# Patient Record
(demographics unavailable — no encounter records)

---

## 2024-12-13 NOTE — HISTORY OF PRESENT ILLNESS
[FreeTextEntry1] : MARIA EUGENIA is a 8 year old F who presents to clinic today for initial evaluation of cough.  API: Allergies +dogs, cats. Food allergies +peanuts   INITIAL VISIT: Visit Date: 12/11/2024   Age of Onset of Symptoms: PMH: PSH: Appendectomy Feb 2023 Meds: Birth Hx: PCP/Specialists:   Cough Hx: Triggers: Allergies: cats, dogs, peanuts Hx of wheezing: JESUS Use: Use of oral steroids: ED/Hospitalizations: Daytime cough: Nighttime cough: Respiratory symptoms with exercise: Chest x-ray:   Family hx: Mom- Dad-  Family hx of asthma: Family hx of cystic fibrosis, autoimmune disease, recurrent respiratory infections: Feeding issues, IMLI: CAPRI Sx, Snoring/Gasping/Pauses: Hx of Eczema: Hx of rhinitis, post nasal drip: Hx of recurrent infections (ie: pneumonia, AOM, sinusitis): Seen by pulmonologist before:   Vaccines UTD: Influenza Vaccine: COVID-19 Vaccine: H/o COVID19/RSV infection:   Modified Asthma Predictive Index (mAPI): 4 wheezing illnesses AND 1 major criteria Parental asthma: NO atopic dermatitis: NO Aeroallergen sensitization suspected: NO   OR   2 minor criteria Food sensitization: NO Peripheral blood eosinophilia = % Wheezing apart from colds: NO

## 2025-01-28 NOTE — REASON FOR VISIT
[Initial Evaluation] : an initial evaluation of [Cough] : cough [Mother] : mother [Medical Records] : medical records [Asthma/RAD] : asthma/RAD

## 2025-01-28 NOTE — SOCIAL HISTORY
[Mother] : mother [Father] : father [Grade:  _____] : Grade: [unfilled] [Dog] : dog [Cat] : cat [Bedroom] : not in the bedroom [Basement] : not in the basement [Living Area] : not in the living area [Smokers in Household] : there are no smokers in the home

## 2025-01-28 NOTE — HISTORY OF PRESENT ILLNESS
[FreeTextEntry1] :  MARIA EUGENIA is a 8 year old F who presents to clinic today for initial evaluation of asthma. API: Allergies +dogs, cats. Food allergies +peanuts. Eczema. Maternal history of asthma.  - Has dogs and cats at home, have been around her entire life. Cat lives downstairs.    INITIAL VISIT: Visit Date: 01/28/2025 - History of asthma, diagnosed around 2 years. Was hospitalized at the time in [place], required nasal cannula - Triggers include viral illnesses and cold weather  - Uses QVar 80mcg 1 puff BID PRN for illness, have not given often over the last year. Trying to limit the amount of medication she is receiving - Mom is concerned that Albuterol makes her hungry and increased HR - Cough has been more recurrent lately during the day and night, occurs most days - PCP gave Symbicort 80mcg 2 puffs BID, have not started yet  - Would like to follow up with Allergy- allergic shiners present  - Does endorse snoring, sleeps with mouth open when this happens. No witnessed apneas. Ongoing congestion this Winter   Age of Onset of Symptoms: 2 years PMH: asthma, environmental and food allergies  PSH: appendectomy Meds: Claritin at night  Birth Hx: FT, no complications  PCP/Specialists: Dr. Segovia   Cough Hx: Triggers: viral illnesses, cold weather Allergies: dogs, cats, peanuts  Hx of wheezing: yes  JESUS Use: albuterol PRN Use of oral steroids: OCS 1 year ED/Hospitalizations: Admission at 2 years, no recent ED visits Daytime cough: yes  Nighttime cough: yes Respiratory symptoms with exercise: denies  Chest x-ray: possible when younger    Family hx:  Mom- asthma  Dad- healthy  Brother, 18 years- healthy  Sister, 20 years- healthy   Family hx of asthma: asthma  Family hx of cystic fibrosis, autoimmune disease, recurrent respiratory infections: Feeding issues, MILI: denies  CAPRI Sx, Snoring/Gasping/Pauses: yes, loud snoring most nights. Has been more tired lately in school. Was snoring over the Summer as well, had good energy throughout the day at that time  Hx of Eczema: yes  Hx of rhinitis, post nasal drip: yes  Hx of recurrent infections (ie: pneumonia, AOM, sinusitis): pneumonia last Fall, at 4 years. No recurrent AOM or sinusitis  Seen by pulmonologist before: denies    Vaccines UTD: yes  Influenza Vaccine: yes  COVID-19 Vaccine: denies  H/o COVID19/RSV infection: denies    Modified Asthma Predictive Index (mAPI): 4 wheezing illnesses AND 1 major criteria Parental asthma: NO atopic dermatitis: NO Aeroallergen sensitization suspected: NO   OR   2 minor criteria Food sensitization: NO Peripheral blood eosinophilia = % Wheezing apart from colds: NO

## 2025-01-28 NOTE — CONSULT LETTER
[Dear  ___] : Dear  [unfilled], [Courtesy Letter:] : I had the pleasure of seeing your patient, [unfilled], in my office today. [Please see my note below.] : Please see my note below. [Consult Closing:] : Thank you very much for allowing me to participate in the care of this patient.  If you have any questions, please do not hesitate to contact me. [Sincerely,] : Sincerely, [FreeTextEntry3] : Huong Ballesteros NP

## 2025-01-28 NOTE — PHYSICAL EXAM
[Well Nourished] : well nourished [Well Developed] : well developed [Alert] : ~L alert [Active] : active [Normal Breathing Pattern] : normal breathing pattern [No Respiratory Distress] : no respiratory distress [No Drainage] : no drainage [No Conjunctivitis] : no conjunctivitis [Tympanic Membranes Clear] : tympanic membranes were clear [No Sinus Tenderness] : no sinus tenderness [No Oral Pallor] : no oral pallor [No Oral Cyanosis] : no oral cyanosis [Non-Erythematous] : non-erythematous [No Exudates] : no exudates [Absence Of Retractions] : absence of retractions [Symmetric] : symmetric [Good Expansion] : good expansion [No Acc Muscle Use] : no accessory muscle use [Good aeration to bases] : good aeration to bases [Equal Breath Sounds] : equal breath sounds bilaterally [No Crackles] : no crackles [No Rhonchi] : no rhonchi [No Wheezing] : no wheezing [Normal Sinus Rhythm] : normal sinus rhythm [No Heart Murmur] : no heart murmur [Soft, Non-Tender] : soft, non-tender [No Hepatosplenomegaly] : no hepatosplenomegaly [Non Distended] : was not ~L distended [Abdomen Mass (___ Cm)] : no abdominal mass palpated [Full ROM] : full range of motion [No Clubbing] : no clubbing [Capillary Refill < 2 secs] : capillary refill less than two seconds [No Cyanosis] : no cyanosis [No Contractures] : no contractures [Abnormal Walk] : normal gait [Alert and  Oriented] : alert and oriented [No Abnormal Focal Findings] : no abnormal focal findings [No Birth Marks] : no birth marks [No Rashes] : no rashes [No Skin Lesions] : no skin lesions [FreeTextEntry2] : +allergic shiners [FreeTextEntry4] : +congested nasal mucosa

## 2025-01-28 NOTE — REVIEW OF SYSTEMS
[Snoring] : snoring [Rhinorrhea] : rhinorrhea [Nasal Congestion] : nasal congestion [Postnasl Drip] : postnasal drip [Wheezing] : wheezing [Cough] : cough [Pneumonia] : pneumonia [Eczema] : eczema [Immunizations are up to date] : Immunizations are up to date [Influenza Vaccine this Past Year] : influenza vaccine this past year [Poor Appetite] : no poor appetite [Frequent URIs] : no frequent upper respiratory infections [Apnea] : no apnea [Sinus Problems] : no sinus problems [Recurrent Ear Infections] : no recurrent ear infections [Heart Disease] : no heart disease [Problems Swallowing] : no problems swallowing [Reflux] : no reflux [Failure To Thrive] : no failure to thrive [FreeTextEntry6] : Pneumonia x1 at 4 years, x1 last fall  [COVID-19 Immunization] : no COVID-19 immunization

## 2025-01-28 NOTE — ASSESSMENT
[FreeTextEntry1] : MARIA EUGENIA MONTES DE OCA is a 8 year girl with history of asthma, food and environmental allergies and eczema. Asthma was diagnosed arounds 2 years. Admitted for exacerbation around this time, required NC. No hospitalizations or ED visits since. Asthma predictive index: mother with history of asthma, personal history of atopic dermatitis, food allergies and aeroallergen sensitization increase Her risk for asthma.   Discussed with parents that asthma is a clinical diagnosis based on risk factors, symptoms and response to medications. Symptoms of chronic cough and recurrent wheeze with a response to bronchodilators are consistent with mild persistent asthma. Most of the child's exacerbations are probably triggered by viral illnesses and cold weather. Her asthma symptoms have been intermittent until this Fall and Winter when she developed daily dry cough. Mother is hesitant to give daily ICS and multiple medications. Albuterol has caused increased hunger and tachycardia so she avoids giving this as well. Spirometry notable for mild obstruction. Lungs clear on exam. I would recommend maintenance anti-inflammatory therapy with Fluticasone Propionate 44mcg 2 puffs BID with spacer for several months to decrease airway inflammation, airway reactivity and achieve optimal control of Her asthma symptoms. Use bronchodilators as needed for exacerbations. Albuterol has caused tachycardia in the past, recommend levalbuterol as needed for rescue. Safety and efficacy of anti-inflammatory medications and bronchodilators were reviewed with the parents.   Discussed asthma, seasonality, and exacerbation with specific triggers including cold weather, allergens, exercise, viral illnesses. Educated on proper MDI/spacer technique and importance of medication compliance. Encouraged avoidance of tobacco smoke exposure and educated on its harmful effects in children with asthma. Discussed signs of respiratory distress and when to seek medical attention.   The patient has clinical findings consistent with rhinitis and an intranasal steroid and/or an anti-histamine with Zyrtec/Claritin and Flonase may be helpful in controlling symptoms associated with a post-nasal drip and upper airway cough syndrome. Environmental allergies +dogs, cats. Has pet cat at home that mainly resides downstairs. Has pet dog at home that does not enter her room often. Food allergies +peanuts. Recommend Allergy visit to help address allergic triggers and determine eligibility for IT or biologics. Aggressive control of airway inflammation secondary to allergies would help achieve optimal asthma control.  Intermittent snoring, sleeps with mouth open most nights. No significant loud snoring, no witnessed apneas. Has been struggling with ongoing nasal congestion this Winter despite Flonase. Recommend ENT evaluation to evaluate for ATH. Instructed mom to record episodes of snoring and monitor for apneas. Consider PSG in the future if symptoms persist.    No confounding issues such as MILI at this time.   No history of recurrent respiratory infections making immune deficiency, cystic fibrosis and primary ciliary dyskinesia less likely at this time.   Discussed recommendations for flu and COVID 19 vaccines. Safety, side effects and efficacy reviewed.   Parents received plans well.   Follow up in 2 months.  Plan: - Flovent 110mcg 2 puffs BID -Claritin and Flonase daily - Albuterol PRN - ENT and Allergy referral - Follow up 2 months

## 2025-03-25 NOTE — HISTORY OF PRESENT ILLNESS
[FreeTextEntry1] : MARIA EUGENIA is a 8 year old F with history of mild persistent asthma, allergic rhinitis, eczema.  API: Allergies +dogs, cats. Food allergies +peanuts. Eczema. Maternal history of asthma.  - Has dogs and cats at home, have been around her entire life. Cat lives downstairs.    March 24th, 2025 Follow up visit: Interval Hx: -Last seen Jan 2025; recs: Flovent 110mcg 2 puffs BID, Claritin, Flonase. ENT and Allergy referral. - Doing well  - Daily meds: Rescue meds: Albuterol PRN Recent ER visits/hospitalizations: Last oral steroid course: Baseline daytime cough, SOB or wheeze: Baseline nocturnal cough, SOB or wheeze: Exertional cough, SOB or wheeze: Allergic rhinitis symptoms: Snoring:   Flu vaccine [id number]: Misc notes:  ==  INITIAL VISIT: Visit Date: 01/28/2025 - History of asthma, diagnosed around 2 years. Was hospitalized at the time in [place], required nasal cannula - Triggers include viral illnesses and cold weather  - Uses QVar 80mcg 1 puff BID PRN for illness, have not given often over the last year. Trying to limit the amount of medication she is receiving - Mom is concerned that Albuterol makes her hungry and increased HR - Cough has been more recurrent lately during the day and night, occurs most days - PCP gave Symbicort 80mcg 2 puffs BID, have not started yet  - Would like to follow up with Allergy- allergic shiners present  - Does endorse snoring, sleeps with mouth open when this happens. No witnessed apneas. Ongoing congestion this Winter   Age of Onset of Symptoms: 2 years PMH: asthma, environmental and food allergies  PSH: appendectomy Meds: Claritin at night  Birth Hx: FT, no complications  PCP/Specialists: Dr. Segovia   Cough Hx: Triggers: viral illnesses, cold weather Allergies: dogs, cats, peanuts  Hx of wheezing: yes  JESUS Use: albuterol PRN Use of oral steroids: OCS 1 year ED/Hospitalizations: Admission at 2 years, no recent ED visits Daytime cough: yes  Nighttime cough: yes Respiratory symptoms with exercise: denies  Chest x-ray: possible when younger    Family hx:  Mom- asthma  Dad- healthy  Brother, 18 years- healthy  Sister, 20 years- healthy   Family hx of asthma: asthma  Family hx of cystic fibrosis, autoimmune disease, recurrent respiratory infections: Feeding issues, MILI: denies  CAPRI Sx, Snoring/Gasping/Pauses: yes, loud snoring most nights. Has been more tired lately in school. Was snoring over the Summer as well, had good energy throughout the day at that time  Hx of Eczema: yes  Hx of rhinitis, post nasal drip: yes  Hx of recurrent infections (ie: pneumonia, AOM, sinusitis): pneumonia last Fall, at 4 years. No recurrent AOM or sinusitis  Seen by pulmonologist before: denies    Vaccines UTD: yes  Influenza Vaccine: yes  COVID-19 Vaccine: denies  H/o COVID19/RSV infection: denies    Modified Asthma Predictive Index (mAPI): 4 wheezing illnesses AND 1 major criteria Parental asthma: NO atopic dermatitis: NO Aeroallergen sensitization suspected: NO   OR   2 minor criteria Food sensitization: NO Peripheral blood eosinophilia = % Wheezing apart from colds: NO